# Patient Record
Sex: MALE | Race: WHITE | NOT HISPANIC OR LATINO | Employment: FULL TIME | URBAN - METROPOLITAN AREA
[De-identification: names, ages, dates, MRNs, and addresses within clinical notes are randomized per-mention and may not be internally consistent; named-entity substitution may affect disease eponyms.]

---

## 2022-07-29 ENCOUNTER — APPOINTMENT (EMERGENCY)
Dept: RADIOLOGY | Facility: HOSPITAL | Age: 22
End: 2022-07-29
Payer: COMMERCIAL

## 2022-07-29 ENCOUNTER — APPOINTMENT (EMERGENCY)
Dept: CT IMAGING | Facility: HOSPITAL | Age: 22
End: 2022-07-29
Payer: COMMERCIAL

## 2022-07-29 ENCOUNTER — HOSPITAL ENCOUNTER (EMERGENCY)
Facility: HOSPITAL | Age: 22
Discharge: HOME | End: 2022-07-29
Attending: EMERGENCY MEDICINE
Payer: COMMERCIAL

## 2022-07-29 ENCOUNTER — APPOINTMENT (OUTPATIENT)
Dept: URGENT CARE | Age: 22
End: 2022-07-29

## 2022-07-29 VITALS
RESPIRATION RATE: 18 BRPM | TEMPERATURE: 97.9 F | HEART RATE: 59 BPM | OXYGEN SATURATION: 99 % | DIASTOLIC BLOOD PRESSURE: 71 MMHG | SYSTOLIC BLOOD PRESSURE: 101 MMHG | WEIGHT: 182.98 LBS

## 2022-07-29 DIAGNOSIS — R07.9 CHEST PAIN: Primary | ICD-10-CM

## 2022-07-29 DIAGNOSIS — M79.672 LEFT FOOT PAIN: ICD-10-CM

## 2022-07-29 DIAGNOSIS — M25.511 RIGHT SHOULDER PAIN: ICD-10-CM
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
## 2022-07-29 DIAGNOSIS — M25.572 LEFT ANKLE PAIN: ICD-10-CM

## 2022-07-29 LAB
ALBUMIN SERPL BCP-MCNC: 4.2 G/DL (ref 3.5–5)
ALP SERPL-CCNC: 102 U/L (ref 46–116)
ALT SERPL W P-5'-P-CCNC: 36 U/L (ref 12–78)
ANION GAP SERPL CALCULATED.3IONS-SCNC: 6 MMOL/L (ref 4–13)
AST SERPL W P-5'-P-CCNC: 23 U/L (ref 5–45)
ATRIAL RATE: 62 BPM
BASOPHILS # BLD AUTO: 0.03 THOUSANDS/ΜL (ref 0–0.1)
BASOPHILS NFR BLD AUTO: 1 % (ref 0–1)
BILIRUB SERPL-MCNC: 0.48 MG/DL (ref 0.2–1)
BUN SERPL-MCNC: 13 MG/DL (ref 5–25)
CALCIUM SERPL-MCNC: 9.4 MG/DL (ref 8.3–10.1)
CHLORIDE SERPL-SCNC: 102 MMOL/L (ref 96–108)
CK MB SERPL-MCNC: 1.7 NG/ML (ref 0–5)
CK MB SERPL-MCNC: <1 % (ref 0–2.5)
CK SERPL-CCNC: 270 U/L (ref 39–308)
CO2 SERPL-SCNC: 30 MMOL/L (ref 21–32)
CREAT SERPL-MCNC: 0.87 MG/DL (ref 0.6–1.3)
EOSINOPHIL # BLD AUTO: 0.13 THOUSAND/ΜL (ref 0–0.61)
EOSINOPHIL NFR BLD AUTO: 2 % (ref 0–6)
ERYTHROCYTE [DISTWIDTH] IN BLOOD BY AUTOMATED COUNT: 13 % (ref 11.6–15.1)
GFR SERPL CREATININE-BSD FRML MDRD: 123 ML/MIN/1.73SQ M
GLUCOSE SERPL-MCNC: 107 MG/DL (ref 65–140)
HCT VFR BLD AUTO: 44.9 % (ref 36.5–49.3)
HGB BLD-MCNC: 15.3 G/DL (ref 12–17)
IMM GRANULOCYTES # BLD AUTO: 0.01 THOUSAND/UL (ref 0–0.2)
IMM GRANULOCYTES NFR BLD AUTO: 0 % (ref 0–2)
LIPASE SERPL-CCNC: 120 U/L (ref 73–393)
LYMPHOCYTES # BLD AUTO: 2.29 THOUSANDS/ΜL (ref 0.6–4.47)
LYMPHOCYTES NFR BLD AUTO: 35 % (ref 14–44)
MCH RBC QN AUTO: 28.7 PG (ref 26.8–34.3)
MCHC RBC AUTO-ENTMCNC: 34.1 G/DL (ref 31.4–37.4)
MCV RBC AUTO: 84 FL (ref 82–98)
MONOCYTES # BLD AUTO: 0.5 THOUSAND/ΜL (ref 0.17–1.22)
MONOCYTES NFR BLD AUTO: 8 % (ref 4–12)
NEUTROPHILS # BLD AUTO: 3.55 THOUSANDS/ΜL (ref 1.85–7.62)
NEUTS SEG NFR BLD AUTO: 54 % (ref 43–75)
NRBC BLD AUTO-RTO: 0 /100 WBCS
P AXIS: 27 DEGREES
PLATELET # BLD AUTO: 167 THOUSANDS/UL (ref 149–390)
PMV BLD AUTO: 11.3 FL (ref 8.9–12.7)
POTASSIUM SERPL-SCNC: 4 MMOL/L (ref 3.5–5.3)
PR INTERVAL: 156 MS
PROT SERPL-MCNC: 8.2 G/DL (ref 6.4–8.4)
QRS AXIS: 80 DEGREES
QRSD INTERVAL: 100 MS
QT INTERVAL: 390 MS
QTC INTERVAL: 395 MS
RBC # BLD AUTO: 5.34 MILLION/UL (ref 3.88–5.62)
SODIUM SERPL-SCNC: 138 MMOL/L (ref 135–147)
T WAVE AXIS: 45 DEGREES
VENTRICULAR RATE: 62 BPM
WBC # BLD AUTO: 6.51 THOUSAND/UL (ref 4.31–10.16)

## 2022-07-29 PROCEDURE — 96374 THER/PROPH/DIAG INJ IV PUSH: CPT

## 2022-07-29 PROCEDURE — 93005 ELECTROCARDIOGRAM TRACING: CPT

## 2022-07-29 PROCEDURE — G1004 CDSM NDSC: HCPCS

## 2022-07-29 PROCEDURE — 83690 ASSAY OF LIPASE: CPT | Performed by: EMERGENCY MEDICINE

## 2022-07-29 PROCEDURE — 74177 CT ABD & PELVIS W/CONTRAST: CPT

## 2022-07-29 PROCEDURE — 73030 X-RAY EXAM OF SHOULDER: CPT

## 2022-07-29 PROCEDURE — 93010 ELECTROCARDIOGRAM REPORT: CPT | Performed by: INTERNAL MEDICINE

## 2022-07-29 PROCEDURE — 85025 COMPLETE CBC W/AUTO DIFF WBC: CPT | Performed by: EMERGENCY MEDICINE

## 2022-07-29 PROCEDURE — 82553 CREATINE MB FRACTION: CPT | Performed by: EMERGENCY MEDICINE

## 2022-07-29 PROCEDURE — 99285 EMERGENCY DEPT VISIT HI MDM: CPT

## 2022-07-29 PROCEDURE — 71260 CT THORAX DX C+: CPT

## 2022-07-29 PROCEDURE — 82550 ASSAY OF CK (CPK): CPT | Performed by: EMERGENCY MEDICINE

## 2022-07-29 PROCEDURE — 99284 EMERGENCY DEPT VISIT MOD MDM: CPT | Performed by: EMERGENCY MEDICINE

## 2022-07-29 PROCEDURE — 36415 COLL VENOUS BLD VENIPUNCTURE: CPT | Performed by: EMERGENCY MEDICINE

## 2022-07-29 PROCEDURE — 80053 COMPREHEN METABOLIC PANEL: CPT | Performed by: EMERGENCY MEDICINE

## 2022-07-29 PROCEDURE — 73630 X-RAY EXAM OF FOOT: CPT

## 2022-07-29 PROCEDURE — 73610 X-RAY EXAM OF ANKLE: CPT

## 2022-07-29 RX ORDER — KETOROLAC TROMETHAMINE 30 MG/ML
15 INJECTION, SOLUTION INTRAMUSCULAR; INTRAVENOUS ONCE
Status: COMPLETED | OUTPATIENT
Start: 2022-07-29 | End: 2022-07-29

## 2022-07-29 RX ORDER — ACETAMINOPHEN 325 MG/1
975 TABLET ORAL ONCE
Status: COMPLETED | OUTPATIENT
Start: 2022-07-29 | End: 2022-07-29

## 2022-07-29 RX ADMIN — KETOROLAC TROMETHAMINE 15 MG: 30 INJECTION, SOLUTION INTRAMUSCULAR; INTRAVENOUS at 14:02

## 2022-07-29 RX ADMIN — ACETAMINOPHEN 975 MG: 325 TABLET, FILM COATED ORAL at 14:01

## 2022-07-29 RX ADMIN — IOHEXOL 53 ML: 350 INJECTION, SOLUTION INTRAVENOUS at 15:09

## 2022-07-29 NOTE — DISCHARGE INSTRUCTIONS
Rest, ice, elevation, compression of ankle    Can take ibuprofen 400 mg every 6 hours as needed for pain  Can take Tylenol 650 mg every 4 hours as needed for pain  Return to ER for worsening pain, shortness of breath, any other symptoms that concern you

## 2022-07-29 NOTE — Clinical Note
Estefania Cardona was seen and treated in our emergency department on 7/29/2022  Diagnosis:     Mohini Sky    He may return on this date: 08/01/2022    Can return if feeling improved  Can do limited activity if having significant pain  If you have any questions or concerns, please don't hesitate to call        Laura Allred MD    ______________________________           _______________          _______________  Hospital Representative                              Date                                Time

## 2022-07-29 NOTE — ED PROVIDER NOTES
History  Chief Complaint   Patient presents with    Chest Pain     Pt arrives after being pinned by a fork lift around 0630 this AM, pt states break failed and rolled onto this L foot and pt states "I had to pull myself away from the fork lift" pt complains of L sided rib/diaphragm pain  Pt eating in the waiting room      61-year-old male presents to the emergency department for chest wall and abdominal injury  Patient says that the emergency break on a forklift gave out and the forklift rolled and pinned him against a wall while he was at work today  It also ran over his left foot and he twisted his left ankle while this happened  He used his right arm to free himself  He now complains of pain to the anterior chest, left upper abdominal wall, right shoulder, left foot, left ankle  No anticoagulant or antiplatelet agent use  Denies headache, neck pain, back pain, other extremity pain, focal neurologic symptoms, or any other injuries or complaints  Chest Pain  Chest pain location: across anterior chest   Pain quality: tightness    Pain radiates to:  Does not radiate  Pain radiates to the back: no    Pain severity:  Moderate  Onset quality:  Sudden  Duration:  3 hours  Timing:  Constant  Progression:  Unchanged  Chronicity:  New  Relieved by:  Nothing  Worsened by:   Movement  Ineffective treatments:  None tried  Associated symptoms: abdominal pain    Associated symptoms: no back pain, no cough, no fever, no headache, no nausea, no shortness of breath and not vomiting    Foot Injury - Major  Location:  Ankle and foot  Time since incident:  3 hours  Injury: yes    Mechanism of injury: crush    Crush:     Mechanism:  Industrial machinery    Duration of crushing force:  3 hours  Ankle location:  L ankle  Foot location:  L foot  Pain details:     Quality:  Aching    Radiates to:  Does not radiate    Severity:  Moderate    Onset quality:  Sudden    Timing:  Constant    Progression:  Unchanged  Chronicity: New  Dislocation: no    Prior injury to area:  No  Worsened by:  Bearing weight  Ineffective treatments:  None tried  Associated symptoms: no back pain, no fever and no neck pain        None       Past Medical History:   Diagnosis Date    Inguinal hernia        No past surgical history on file  No family history on file  I have reviewed and agree with the history as documented  E-Cigarette/Vaping     E-Cigarette/Vaping Substances          Review of Systems   Constitutional: Negative  Negative for chills and fever  HENT: Negative  Negative for congestion and rhinorrhea  Eyes: Negative  Respiratory: Negative  Negative for cough and shortness of breath  Cardiovascular: Positive for chest pain  Negative for leg swelling  Gastrointestinal: Positive for abdominal pain  Negative for abdominal distention, diarrhea, nausea and vomiting  Musculoskeletal: Negative for back pain and neck pain  Left foot and ankle pain   Skin: Negative  Negative for rash  Neurological: Negative  Negative for light-headedness and headaches  Hematological: Negative  All other systems reviewed and are negative  Physical Exam  Physical Exam  Vitals and nursing note reviewed  Constitutional:       General: He is not in acute distress  Appearance: He is well-developed  HENT:      Head: Normocephalic and atraumatic  Mouth/Throat:      Mouth: Mucous membranes are moist    Eyes:      Pupils: Pupils are equal, round, and reactive to light  Cardiovascular:      Rate and Rhythm: Normal rate and regular rhythm  Heart sounds: Normal heart sounds  No murmur heard  No friction rub  No gallop  Pulmonary:      Effort: Pulmonary effort is normal  No respiratory distress  Breath sounds: Normal breath sounds  No stridor  No wheezing, rhonchi or rales  Chest:      Chest wall: Tenderness (across anterior chest) present  No deformity or crepitus     Abdominal:      Palpations: Abdomen is soft       Tenderness: There is abdominal tenderness (mild) in the left upper quadrant  There is no guarding or rebound  Musculoskeletal:         General: No swelling or tenderness  Normal range of motion  Cervical back: Normal range of motion and neck supple  Right lower leg: No edema  Left lower leg: No edema  Comments: Small ecchymosis over dorsum of left foot  Mild tenderness over navicular bone and lateral malleolus  Knee: Non-tender, no joint laxity with valgus/varus strain or anterior/posterior drawer test, no swelling  Ankle:  no joint laxity, no swelling   Foot: No tenderness of the 5th metatarsal  Distal sensation intact  Cap refill <2 seconds  Mild anterior right shoulder tenderness  M/U/R/A nerve sensation intact  Finger abduction/adduction/opposition intact  Suppination/Pronation intact without reproduction of pain  Able to 1+2 and 1+5 finger appose  Able to 2+3 finger cross  Good capillary refill  2+ radial pulses, bilaterally equal    BICEPS/TRICEPS 5/5  Shoulder abduction/adduction 5/5   2+ DP and PT pulses    Skin:     General: Skin is warm and dry  Capillary Refill: Capillary refill takes less than 2 seconds  Neurological:      Mental Status: He is alert and oriented to person, place, and time        Comments: Clear fluent speech         Vital Signs  ED Triage Vitals [07/29/22 1124]   Temperature Pulse Respirations Blood Pressure SpO2   98 2 °F (36 8 °C) 68 20 139/93 100 %      Temp Source Heart Rate Source Patient Position - Orthostatic VS BP Location FiO2 (%)   Oral Monitor Sitting Right arm --      Pain Score       --           Vitals:    07/29/22 1124 07/29/22 1306   BP: 139/93 101/71   Pulse: 68 59   Patient Position - Orthostatic VS: Sitting Sitting         Visual Acuity      ED Medications  Medications   ketorolac (TORADOL) injection 15 mg (15 mg Intravenous Given 7/29/22 1402)   acetaminophen (TYLENOL) tablet 975 mg (975 mg Oral Given 7/29/22 1401)   iohexol (OMNIPAQUE) 350 MG/ML injection (MULTI-DOSE) 53 mL (53 mL Intravenous Given 7/29/22 4609)       Diagnostic Studies  Results Reviewed     Procedure Component Value Units Date/Time    CKMB [[id number]]  (Normal) Collected: 07/29/22 1400    Lab Status: Final result Specimen: Blood from Arm, Right Updated: 07/29/22 1525     CK-MB Index <1 0 %      CK-MB 1 7 ng/mL     Lipase [[id number]]  (Normal) Collected: 07/29/22 1400    Lab Status: Final result Specimen: Blood from Arm, Right Updated: 07/29/22 1445     Lipase 120 u/L     CK (with reflex to MB) [[id number]]  (Normal) Collected: 07/29/22 1400    Lab Status: Final result Specimen: Blood from Arm, Right Updated: 07/29/22 1445     Total  U/L     Comprehensive metabolic panel [[id number]] Collected: 07/29/22 1400    Lab Status: Final result Specimen: Blood from Arm, Right Updated: 07/29/22 1426     Sodium 138 mmol/L      Potassium 4 0 mmol/L      Chloride 102 mmol/L      CO2 30 mmol/L      ANION GAP 6 mmol/L      BUN 13 mg/dL      Creatinine 0 87 mg/dL      Glucose 107 mg/dL      Calcium 9 4 mg/dL      AST 23 U/L      ALT 36 U/L      Alkaline Phosphatase 102 U/L      Total Protein 8 2 g/dL      Albumin 4 2 g/dL      Total Bilirubin 0 48 mg/dL      eGFR 123 ml/min/1 73sq m     Narrative:      [place] guidelines for Chronic Kidney Disease (CKD):     Stage 1 with normal or high GFR (GFR > 90 mL/min/1 73 square meters)    Stage 2 Mild CKD (GFR = 60-89 mL/min/1 73 square meters)    Stage 3A Moderate CKD (GFR = 45-59 mL/min/1 73 square meters)    Stage 3B Moderate CKD (GFR = 30-44 mL/min/1 73 square meters)    Stage 4 Severe CKD (GFR = 15-29 mL/min/1 73 square meters)    Stage 5 End Stage CKD (GFR <15 mL/min/1 73 square meters)  Note: GFR calculation is accurate only with a steady state creatinine    CBC and differential [[id number]] Collected: 07/29/22 1400    Lab Status: Final result Specimen: Blood from Arm, Right Updated: 07/29/22 1407     WBC 6 51 Thousand/uL      RBC 5 34 Million/uL      Hemoglobin 15 3 g/dL      Hematocrit 44 9 %      MCV 84 fL      MCH 28 7 pg      MCHC 34 1 g/dL      RDW 13 0 %      MPV 11 3 fL      Platelets 595 Thousands/uL      nRBC 0 /100 WBCs      Neutrophils Relative 54 %      Immat GRANS % 0 %      Lymphocytes Relative 35 %      Monocytes Relative 8 %      Eosinophils Relative 2 %      Basophils Relative 1 %      Neutrophils Absolute 3 55 Thousands/µL      Immature Grans Absolute 0 01 Thousand/uL      Lymphocytes Absolute 2 29 Thousands/µL      Monocytes Absolute 0 50 Thousand/µL      Eosinophils Absolute 0 13 Thousand/µL      Basophils Absolute 0 03 Thousands/µL                  CT chest abdomen pelvis w contrast   Final Result by Cecilia Webb MD (07/29 1550)      No acute CT findings  Workstation performed: [id number]         XR foot 3+ views LEFT   Final Result by Aisha Dexter MD (07/29 1427)      No acute osseous abnormality  Workstation performed: [id number]         XR ankle 3+ views LEFT   Final Result by Aisha Dexter MD (07/29 1427)      No acute osseous abnormality  Workstation performed: [id number]         XR shoulder 2+ views RIGHT   Final Result by Aisha Dexter MD (07/29 1428)      No acute osseous abnormality  Workstation performed: [id number]                    Procedures  Procedures         ED Course                                             MDM  Number of Diagnoses or Management Options  Chest pain  Forklift accident, initial encounter  Left ankle pain  Left foot pain  Right shoulder pain  Diagnosis management comments: Patient here with for COVID injury to chest, abdomen, right shoulder, left foot and ankle  Is hemodynamically stable  Mild tenderness on exam   Obtain CT and x-rays to evaluate for injuries  Trauma labs  Analgesics  Final assessment:  Imaging reassuring    Patient feels mildly improved on exam   Advised supportive care   Provided crutches and Ace wrap  Strict ED return precautions provided should symptoms worsen and patient can otherwise follow up outpatient  Patient expresses an understanding and agreement with the plan and remains in good condition for discharge  Disposition  Final diagnoses:   Chest pain   Forklift accident, initial encounter   Right shoulder pain   Left foot pain   Left ankle pain     Time reflects when diagnosis was documented in both MDM as applicable and the Disposition within this note     Time User Action Codes Description Comment    7/29/2022  3:57 PM Minor, Katlin Add [R07 9] Chest pain     7/29/2022  3:57 PM Minor, Katlin Add Kirstin Begin  9XXA] Forklift accident, initial encounter     7/29/2022  3:57 PM Minor, Katlin Add [M25 511] Right shoulder pain     7/29/2022  3:57 PM Minor, Tonnie Alejo Add [M79 672] Left foot pain     7/29/2022  3:57 PM Minor, Tonnie Alejo Add [M25 572] Left ankle pain       ED Disposition     ED Disposition   Discharge    Condition   Stable    Date/Time   Fri Jul 29, 2022  3:57 PM    Comment   Shaheed Ricks discharge to home/self care  Follow-up Information     Follow up With Specialties Details Why Contact Info Additional [address] [facility] Emergency Department Emergency Medicine Go to  If symptoms worsen [facility] [address]  [address] [facility] Emergency Department, [address] [facility] Medicine Call in 1 day  [address], [address] [facility] [address]  [address], [address], [address] [place], [place], [address]          There are no discharge medications for this patient  No discharge procedures on file      PDMP Review     None          ED Provider  Electronically Signed by           Tavo Tineo MD  07/29/22 8580

## 2022-08-01 ENCOUNTER — APPOINTMENT (OUTPATIENT)
Dept: URGENT CARE | Age: 22
End: 2022-08-01
Payer: OTHER MISCELLANEOUS

## 2022-08-01 PROCEDURE — 99213 OFFICE O/P EST LOW 20 MIN: CPT | Performed by: NURSE PRACTITIONER

## 2022-08-05 ENCOUNTER — APPOINTMENT (OUTPATIENT)
Dept: URGENT CARE | Age: 22
End: 2022-08-05
Payer: OTHER MISCELLANEOUS

## 2022-08-05 PROCEDURE — 99213 OFFICE O/P EST LOW 20 MIN: CPT

## 2022-08-12 ENCOUNTER — APPOINTMENT (OUTPATIENT)
Dept: URGENT CARE | Age: 22
End: 2022-08-12
Payer: OTHER MISCELLANEOUS

## 2022-08-12 PROCEDURE — 99213 OFFICE O/P EST LOW 20 MIN: CPT | Performed by: PREVENTIVE MEDICINE

## 2022-08-19 ENCOUNTER — APPOINTMENT (OUTPATIENT)
Dept: URGENT CARE | Age: 22
End: 2022-08-19
Payer: OTHER MISCELLANEOUS

## 2022-08-19 PROCEDURE — 99213 OFFICE O/P EST LOW 20 MIN: CPT | Performed by: PREVENTIVE MEDICINE

## 2024-01-16 ENCOUNTER — APPOINTMENT (EMERGENCY)
Dept: RADIOLOGY | Facility: HOSPITAL | Age: 24
End: 2024-01-16

## 2024-01-16 ENCOUNTER — HOSPITAL ENCOUNTER (EMERGENCY)
Facility: HOSPITAL | Age: 24
Discharge: HOME | End: 2024-01-16
Attending: EMERGENCY MEDICINE | Admitting: EMERGENCY MEDICINE

## 2024-01-16 VITALS
TEMPERATURE: 98.4 F | SYSTOLIC BLOOD PRESSURE: 120 MMHG | HEART RATE: 86 BPM | DIASTOLIC BLOOD PRESSURE: 70 MMHG | OXYGEN SATURATION: 100 % | RESPIRATION RATE: 18 BRPM

## 2024-01-16 DIAGNOSIS — S60.10XA SUBUNGUAL HEMATOMA OF FINGER, INITIAL ENCOUNTER: ICD-10-CM

## 2024-01-16 DIAGNOSIS — S60.00XA FINGER CONTUSION: Primary | ICD-10-CM

## 2024-01-16 PROCEDURE — 73130 X-RAY EXAM OF HAND: CPT

## 2024-01-16 PROCEDURE — 99284 EMERGENCY DEPT VISIT MOD MDM: CPT | Performed by: PHYSICIAN ASSISTANT

## 2024-01-16 PROCEDURE — 99283 EMERGENCY DEPT VISIT LOW MDM: CPT

## 2024-01-16 RX ORDER — ACETAMINOPHEN 500 MG
500 TABLET ORAL EVERY 6 HOURS PRN
Qty: 30 TABLET | Refills: 0 | Status: SHIPPED | OUTPATIENT
Start: 2024-01-16

## 2024-01-16 RX ORDER — ACETAMINOPHEN 325 MG/1
650 TABLET ORAL ONCE
Status: COMPLETED | OUTPATIENT
Start: 2024-01-16 | End: 2024-01-16

## 2024-01-16 RX ORDER — NAPROXEN 500 MG/1
500 TABLET ORAL 2 TIMES DAILY WITH MEALS
Qty: 30 TABLET | Refills: 0 | Status: SHIPPED | OUTPATIENT
Start: 2024-01-16

## 2024-01-16 RX ADMIN — ACETAMINOPHEN 650 MG: 325 TABLET, FILM COATED ORAL at 03:56

## 2024-01-16 NOTE — Clinical Note
Yunier Flores was seen and treated in our emergency department on 1/16/2024.    No restrictions            Diagnosis:     Yunier  may return to work on return date.    He may return on this date: 01/17/2024         If you have any questions or concerns, please don't hesitate to call.      Lisette Padgett PA-C    ______________________________           _______________          _______________  Hospital Representative                              Date                                Time

## 2024-01-16 NOTE — DISCHARGE INSTRUCTIONS
Use medications as needed.  Follow-up with orthopedic specialist if pain continues.  Return for new or worsening complaints.  Apply ice 3 times daily for 20 minutes each time.

## 2024-01-16 NOTE — ED PROVIDER NOTES
History  No chief complaint on file.    23-year-old right-handed male without significant past medical history presents complaining of right second and third digit pain after closing his hand in a car door.  Has not taken any medications prior to arrival.  Denies any other injury sustained.  Denies any other complaints.      History provided by:  Patient   used: No        None       Past Medical History:   Diagnosis Date    Inguinal hernia        No past surgical history on file.    No family history on file.  I have reviewed and agree with the history as documented.    E-Cigarette/Vaping     E-Cigarette/Vaping Substances          Review of Systems   Constitutional: Negative.  Negative for chills and fatigue.   HENT:  Negative for ear pain and sore throat.    Eyes:  Negative for photophobia and redness.   Respiratory:  Negative for apnea, cough and shortness of breath.    Cardiovascular:  Negative for chest pain.   Gastrointestinal:  Negative for abdominal pain, nausea and vomiting.   Genitourinary:  Negative for dysuria.   Musculoskeletal:  Negative for arthralgias, neck pain and neck stiffness.        R 2nd and 3rd digit pain    Skin:  Negative for rash.   Neurological:  Negative for dizziness, tremors, syncope and weakness.   Psychiatric/Behavioral:  Negative for suicidal ideas.        Physical Exam  Physical Exam  Constitutional:       General: He is not in acute distress.     Appearance: He is well-developed. He is not diaphoretic.   Eyes:      Pupils: Pupils are equal, round, and reactive to light.   Cardiovascular:      Rate and Rhythm: Normal rate and regular rhythm.   Pulmonary:      Effort: Pulmonary effort is normal. No respiratory distress.      Breath sounds: Normal breath sounds.   Abdominal:      General: Bowel sounds are normal. There is no distension.      Palpations: Abdomen is soft.   Musculoskeletal:         General: Normal range of motion.      Cervical back: Normal range of  "motion and neck supple.      Comments: Right hand without obvious deformity, minor swelling and erythema noted to the distal aspect of the right second digit with minor erythema noted to the distal phalanx of the right third digit.  Minor less than 25% subungual hematoma noted to the right second nail.  Cap refills in 2 seconds.  Range of motion intact.  Radial pulse 2+.  No obvious open breaks in skin or bleeding.   Skin:     General: Skin is warm and dry.   Neurological:      Mental Status: He is alert and oriented to person, place, and time.         Vital Signs  ED Triage Vitals [01/16/24 0343]   Temperature Pulse Respirations Blood Pressure SpO2   98.4 °F (36.9 °C) 86 18 120/70 100 %      Temp Source Heart Rate Source Patient Position - Orthostatic VS BP Location FiO2 (%)   Oral -- -- Left arm --      Pain Score       --           Vitals:    01/16/24 0343   BP: 120/70   Pulse: 86         Visual Acuity      ED Medications  Medications   acetaminophen (TYLENOL) tablet 650 mg (650 mg Oral Given 1/16/24 0356)       Diagnostic Studies  Results Reviewed       None                   XR hand 3+ views RIGHT   ED Interpretation by Lisette Padgett PA-C (01/16 0355)   No obvious osseous abnormality                  Procedures  Splint application    Date/Time: 1/16/2024 3:56 AM    Performed by: Lisette Padgett PA-C  Authorized by: Lisette Padgett PA-C  Universal Protocol:  Consent: Verbal consent obtained.  Risks and benefits: risks, benefits and alternatives were discussed  Consent given by: patient  Time out: Immediately prior to procedure a \"time out\" was called to verify the correct patient, procedure, equipment, support staff and site/side marked as required.  Patient understanding: patient states understanding of the procedure being performed  Patient consent: the patient's understanding of the procedure matches consent given  Procedure consent: procedure consent matches procedure scheduled  Relevant " documents: relevant documents present and verified  Test results: test results available and properly labeled  Site marked: the operative site was marked  Radiology Images displayed and confirmed. If images not available, report reviewed: imaging studies available  Required items: required blood products, implants, devices, and special equipment available  Patient identity confirmed: verbally with patient    Pre-procedure details:     Sensation:  Normal  Procedure details:     Laterality:  Right    Location:  Finger    Finger:  R index finger    Strapping: no      Splint type:  Finger splint, static    Supplies:  Aluminum splint           ED Course                                             Medical Decision Making  Patient presented with pain following a crush injury to the right second digit.  No obvious osseous abnormality was seen on imaging.  Patient was given a static removable splint for comfort.  Given orthopedic follow-up.  Educated on supportive care and return precautions.  Patient was neurovascular intact at time of exam.  Discharged home.    Amount and/or Complexity of Data Reviewed  Radiology: ordered and independent interpretation performed.    Risk  OTC drugs.             Disposition  Final diagnoses:   Finger contusion   Subungual hematoma of finger, initial encounter     Time reflects when diagnosis was documented in both MDM as applicable and the Disposition within this note       Time User Action Codes Description Comment    1/16/2024  3:57 AM Lisette Padgett Add [S60.00XA] Finger contusion     1/16/2024  3:57 AM Lisette Padgett Add [S60.10XA] Subungual hematoma of finger, initial encounter           ED Disposition       ED Disposition   Discharge    Condition   Stable    Date/Time   Tue Jan 16, 2024  3:57 AM    Comment   Yunier Flores discharge to home/self care.                   Follow-up Information       Follow up With Specialties Details Why Contact Info Additional Information      [facility] Emergency Department Emergency Medicine Go to  If symptoms worsen [address] [facility] [address] [phone] [facility] Emergency Department    [facility] Schedule an appointment as soon as possible for a visit in 1 day  [address]  [address]  [facility] [address] [phone] [facility] [place] [address] [address], [address], [place], [place], [address] [phone]            Patient's Medications   Discharge Prescriptions    ACETAMINOPHEN (TYLENOL) 500 MG TABLET    Take 1 tablet (500 mg total) by mouth every 6 (six) hours as needed for moderate pain       Start Date: 1/16/2024 End Date: --       Order Dose: 500 mg       Quantity: 30 tablet    Refills: 0    NAPROXEN (NAPROSYN) 500 MG TABLET    Take 1 tablet (500 mg total) by mouth 2 (two) times a day with meals       Start Date: 1/16/2024 End Date: --       Order Dose: 500 mg       Quantity: 30 tablet    Refills: 0       No discharge procedures on file.    PDMP Review       None            ED Provider  Electronically Signed by             Lisette Padgett PA-C  01/16/24 0359

## 2024-02-05 ENCOUNTER — APPOINTMENT (EMERGENCY)
Dept: RADIOLOGY | Facility: HOSPITAL | Age: 24
End: 2024-02-05
Payer: COMMERCIAL

## 2024-02-05 ENCOUNTER — HOSPITAL ENCOUNTER (EMERGENCY)
Facility: HOSPITAL | Age: 24
Discharge: HOME | End: 2024-02-05
Attending: EMERGENCY MEDICINE | Admitting: EMERGENCY MEDICINE
Payer: COMMERCIAL

## 2024-02-05 VITALS
HEART RATE: 75 BPM | TEMPERATURE: 98.1 F | OXYGEN SATURATION: 96 % | SYSTOLIC BLOOD PRESSURE: 150 MMHG | WEIGHT: 200 LBS | RESPIRATION RATE: 18 BRPM | DIASTOLIC BLOOD PRESSURE: 92 MMHG

## 2024-02-05 DIAGNOSIS — S69.90XA HAND INJURIES: Primary | ICD-10-CM

## 2024-02-05 DIAGNOSIS — T14.8XXA CRUSH INJURY: ICD-10-CM

## 2024-02-05 PROCEDURE — 99284 EMERGENCY DEPT VISIT MOD MDM: CPT | Performed by: EMERGENCY MEDICINE

## 2024-02-05 PROCEDURE — 96372 THER/PROPH/DIAG INJ SC/IM: CPT

## 2024-02-05 PROCEDURE — 73130 X-RAY EXAM OF HAND: CPT

## 2024-02-05 PROCEDURE — 99283 EMERGENCY DEPT VISIT LOW MDM: CPT

## 2024-02-05 RX ORDER — KETOROLAC TROMETHAMINE 30 MG/ML
30 INJECTION, SOLUTION INTRAMUSCULAR; INTRAVENOUS ONCE
Status: COMPLETED | OUTPATIENT
Start: 2024-02-05 | End: 2024-02-05

## 2024-02-05 RX ORDER — IBUPROFEN 600 MG/1
600 TABLET ORAL EVERY 6 HOURS PRN
Qty: 30 TABLET | Refills: 0 | Status: SHIPPED | OUTPATIENT
Start: 2024-02-05

## 2024-02-05 RX ADMIN — KETOROLAC TROMETHAMINE 30 MG: 30 INJECTION, SOLUTION INTRAMUSCULAR; INTRAVENOUS at 01:33

## 2024-02-05 NOTE — Clinical Note
Yunier Flores was seen and treated in our emergency department on 2/5/2024.                Diagnosis:     Yunier  .    He may return on this date:     Please excuse from work for the next three days.     If you have any questions or concerns, please don't hesitate to call.      Jose Mccall, DO    ______________________________           _______________          _______________  Hospital Representative                              Date                                Time

## 2024-02-05 NOTE — ED PROVIDER NOTES
History  Chief Complaint   Patient presents with    Hand Injury     Pt was moving a bed frame when the person that was holding the bed frame at the top lost their . The frame fell trapping the pt's hand between the wall and bed frame. Having tenderness on the top of L hand. Able to move fingers.     23-year-old gentleman presents with complaint of pain in his left hand.  He reports that he was moving a bed frame and the other individual lost their  causing the bed frame to pinch his hand against the wall.  The episode happened approximately 30 minutes prior to arrival and no medications were taken.  The patient applied ice and complains of pain diffusely across the dorsal surface of his hand.  He is able to move his fingers and has full sensation but movement does increase his pain.  Patient is right-hand dominant.      Hand Injury  Location:  Hand  Hand location:  L hand  Injury: yes    Time since incident:  30 minutes  Mechanism of injury: crush    Pain details:     Quality:  Aching    Radiates to:  Does not radiate    Severity:  Moderate    Onset quality:  Sudden    Duration: 30 minutes.    Timing:  Constant    Progression:  Unchanged  Handedness:  Right-handed  Dislocation: no    Prior injury to area:  No  Relieved by:  Nothing  Worsened by:  Movement  Ineffective treatments:  Ice  Associated symptoms: stiffness    Associated symptoms: no numbness, no swelling and no tingling        Prior to Admission Medications   Prescriptions Last Dose Informant Patient Reported? Taking?   acetaminophen (TYLENOL) 500 mg tablet   No No   Sig: Take 1 tablet (500 mg total) by mouth every 6 (six) hours as needed for moderate pain   naproxen (Naprosyn) 500 mg tablet   No No   Sig: Take 1 tablet (500 mg total) by mouth 2 (two) times a day with meals      Facility-Administered Medications: None       Past Medical History:   Diagnosis Date    Inguinal hernia        History reviewed. No pertinent surgical history.    History  reviewed. No pertinent family history.  I have reviewed and agree with the history as documented.    E-Cigarette/Vaping    E-Cigarette Use Never User      E-Cigarette/Vaping Substances     Social History     Tobacco Use    Smoking status: Never    Smokeless tobacco: Never   Vaping Use    Vaping status: Never Used   Substance Use Topics    Alcohol use: Never    Drug use: Never       Review of Systems   Musculoskeletal:  Positive for stiffness.   All other systems reviewed and are negative.      Physical Exam  Physical Exam  Vitals and nursing note reviewed.   Constitutional:       General: He is not in acute distress.     Appearance: Normal appearance. He is well-developed. He is not ill-appearing, toxic-appearing or diaphoretic.   HENT:      Head: Normocephalic and atraumatic.      Right Ear: External ear normal.      Left Ear: External ear normal.      Nose: Nose normal.   Eyes:      General: No scleral icterus.  Pulmonary:      Effort: Pulmonary effort is normal. No respiratory distress.   Musculoskeletal:      Cervical back: Normal range of motion and neck supple.   Skin:     General: Skin is warm and dry.      Capillary Refill: Capillary refill takes less than 2 seconds.      Findings: No abrasion, ecchymosis, erythema or laceration.          Neurological:      General: No focal deficit present.      Mental Status: He is alert and oriented to person, place, and time.      Sensory: No sensory deficit.      Motor: No weakness.   Psychiatric:         Mood and Affect: Mood normal.         Behavior: Behavior normal.         Vital Signs  ED Triage Vitals   Temperature Pulse Respirations Blood Pressure SpO2   02/05/24 0126 02/05/24 0126 02/05/24 0126 02/05/24 0126 02/05/24 0126   98.1 °F (36.7 °C) 75 18 150/92 96 %      Temp Source Heart Rate Source Patient Position - Orthostatic VS BP Location FiO2 (%)   02/05/24 0126 02/05/24 0126 02/05/24 0126 02/05/24 0126 --   Oral Other (Comment) Sitting Right arm       Pain  Score       02/05/24 0133       5           Vitals:    02/05/24 0126   BP: 150/92   Pulse: 75   Patient Position - Orthostatic VS: Sitting         Visual Acuity      ED Medications  Medications   ketorolac (TORADOL) injection 30 mg (30 mg Intramuscular Given 2/5/24 0133)       Diagnostic Studies  Results Reviewed       None                   XR hand 3+ views LEFT   ED Interpretation by Jose Mccall DO (02/05 0153)   No fx                 Procedures  Procedures         ED Course                               SBIRT 20yo+      Flowsheet Row Most Recent Value   Initial Alcohol Screen: US AUDIT-C     1. How often do you have a drink containing alcohol? 0 Filed at: 02/05/2024 0137   2. How many drinks containing alcohol do you have on a typical day you are drinking?  0 Filed at: 02/05/2024 0137   3a. Male UNDER 65: How often do you have five or more drinks on one occasion? 0 Filed at: 02/05/2024 0137   Audit-C Score 0 Filed at: 02/05/2024 0137   FAM: How many times in the past year have you...    Used an illegal drug or used a prescription medication for non-medical reasons? Never Filed at: 02/05/2024 0137                      Medical Decision Making  43-year-old gentleman presents with complaint of left hand pain.  He suffered a crush injury in which his hand was pinned between the wall and a bed frame.  X-ray showed no acute fracture or dislocation.  Suspect soft tissue injury/bruising only.  Patient will follow-up with orthopedics as needed.  Discussed supportive care measures and reasons to return to the ER.             Disposition  Final diagnoses:   Hand injuries   Crush injury     Time reflects when diagnosis was documented in both MDM as applicable and the Disposition within this note       Time User Action Codes Description Comment    2/5/2024  1:53 AM Jose Mccall [S69.90XA] Hand injuries     2/5/2024  1:53 AM Jose Mccall [T14.8XXA] Crush injury     2/5/2024  1:54 AM Jose Mccall Modify  [S69.90XA] Hand injuries     2/5/2024  1:54 AM Jose Mccall Modify [T14.8XXA] Crush injury     2/5/2024  1:54 AM Jose Mccall Modify [S69.90XA] Hand injuries     2/5/2024  1:54 AM Jose Mccall Modify [T14.8XXA] Crush injury           ED Disposition       ED Disposition   Discharge    Condition   Stable    Date/Time   Mon Feb 5, 2024 0153    Comment   Yunier Harperzeynep discharge to home/self care.                   Follow-up Information       Follow up With Specialties Details Why Contact Info Additional Information    [facility]  As needed [address]  [facility] [address] [phone] [facility] [place] [address], [place] [address], [place], [place], [address] [phone]            Patient's Medications   Discharge Prescriptions    IBUPROFEN (MOTRIN) 600 MG TABLET    Take 1 tablet (600 mg total) by mouth every 6 (six) hours as needed for mild pain       Start Date: 2/5/2024  End Date: --       Order Dose: 600 mg       Quantity: 30 tablet    Refills: 0       No discharge procedures on file.    PDMP Review       None            ED Provider  Electronically Signed by             Jose Mccall DO  02/05/24 0155

## 2024-08-12 ENCOUNTER — HOSPITAL ENCOUNTER (EMERGENCY)
Facility: HOSPITAL | Age: 24
Discharge: HOME | End: 2024-08-12
Attending: EMERGENCY MEDICINE
Payer: COMMERCIAL

## 2024-08-12 VITALS
RESPIRATION RATE: 18 BRPM | DIASTOLIC BLOOD PRESSURE: 81 MMHG | SYSTOLIC BLOOD PRESSURE: 133 MMHG | TEMPERATURE: 98.1 F | WEIGHT: 202.16 LBS | OXYGEN SATURATION: 98 % | HEART RATE: 98 BPM

## 2024-08-12 DIAGNOSIS — L60.0 INGROWN NAIL OF GREAT TOE OF LEFT FOOT: Primary | ICD-10-CM

## 2024-08-12 PROCEDURE — 99283 EMERGENCY DEPT VISIT LOW MDM: CPT

## 2024-08-12 PROCEDURE — 99284 EMERGENCY DEPT VISIT MOD MDM: CPT | Performed by: EMERGENCY MEDICINE

## 2024-08-12 RX ORDER — LIDOCAINE HYDROCHLORIDE 10 MG/ML
5 INJECTION, SOLUTION EPIDURAL; INFILTRATION; INTRACAUDAL; PERINEURAL ONCE
Status: COMPLETED | OUTPATIENT
Start: 2024-08-12 | End: 2024-08-12

## 2024-08-12 RX ORDER — ACETAMINOPHEN 325 MG/1
650 TABLET ORAL ONCE
Status: COMPLETED | OUTPATIENT
Start: 2024-08-12 | End: 2024-08-12

## 2024-08-12 RX ORDER — IBUPROFEN 600 MG/1
600 TABLET, FILM COATED ORAL ONCE
Status: COMPLETED | OUTPATIENT
Start: 2024-08-12 | End: 2024-08-12

## 2024-08-12 RX ADMIN — LIDOCAINE HYDROCHLORIDE 5 ML: 10 INJECTION, SOLUTION EPIDURAL; INFILTRATION; INTRACAUDAL; PERINEURAL at 21:17

## 2024-08-12 RX ADMIN — ACETAMINOPHEN 650 MG: 325 TABLET ORAL at 21:17

## 2024-08-12 RX ADMIN — IBUPROFEN 600 MG: 600 TABLET, FILM COATED ORAL at 21:17

## 2024-08-12 NOTE — Clinical Note
Yunier Flores was seen and treated in our emergency department on 8/12/2024.                Diagnosis:     Yunier  may return to work on return date.    He may return on this date: 08/14/2024         If you have any questions or concerns, please don't hesitate to call.      Seda Yarbrough MD    ______________________________           _______________          _______________  Hospital Representative                              Date                                Time

## 2024-08-12 NOTE — Clinical Note
Yunier Flores was seen and treated in our emergency department on 8/12/2024.                Diagnosis:     Yunier  may return to work on return date.    He may return on this date: 08/13/2024         If you have any questions or concerns, please don't hesitate to call.      Seda Yarbrough MD    ______________________________           _______________          _______________  Hospital Representative                              Date                                Time

## 2024-08-13 NOTE — ED PROVIDER NOTES
History  Chief Complaint   Patient presents with    Toe Pain     Left great toe with swelling and increasing pain d/t recent infection; was treated with Doxy by urgent care; has 3 caps left but pain got worse so came to ED. No known fevers at home. Tylenol 4-5hrs ago.      23-year-old male presenting emerged department with left great toe swelling and pain.  Patient was on doxycycline for possible paronychia.  Taking antibiotics without relief.        Prior to Admission Medications   Prescriptions Last Dose Informant Patient Reported? Taking?   acetaminophen (TYLENOL) 500 mg tablet Not Taking  No No   Sig: Take 1 tablet (500 mg total) by mouth every 6 (six) hours as needed for moderate pain   Patient not taking: Reported on 8/12/2024   ibuprofen (MOTRIN) 600 mg tablet Not Taking  No No   Sig: Take 1 tablet (600 mg total) by mouth every 6 (six) hours as needed for mild pain   Patient not taking: Reported on 8/12/2024   naproxen (Naprosyn) 500 mg tablet Not Taking  No No   Sig: Take 1 tablet (500 mg total) by mouth 2 (two) times a day with meals   Patient not taking: Reported on 8/12/2024      Facility-Administered Medications: None       Past Medical History:   Diagnosis Date    Inguinal hernia        History reviewed. No pertinent surgical history.    History reviewed. No pertinent family history.  I have reviewed and agree with the history as documented.    E-Cigarette/Vaping    E-Cigarette Use Never User      E-Cigarette/Vaping Substances     Social History     Tobacco Use    Smoking status: Never    Smokeless tobacco: Never   Vaping Use    Vaping status: Never Used   Substance Use Topics    Alcohol use: Never    Drug use: Never       Review of Systems   Constitutional:  Negative for chills and fever.   HENT:  Negative for ear pain and sore throat.    Eyes:  Negative for pain and visual disturbance.   Respiratory:  Negative for cough and shortness of breath.    Cardiovascular:  Negative for chest pain and  palpitations.   Gastrointestinal:  Negative for abdominal pain and vomiting.   Genitourinary:  Negative for dysuria and hematuria.   Musculoskeletal:  Positive for arthralgias. Negative for back pain.   Skin:  Negative for color change and rash.   Neurological:  Negative for seizures and syncope.   All other systems reviewed and are negative.      Physical Exam  Physical Exam  Vitals and nursing note reviewed.   Constitutional:       General: He is not in acute distress.     Appearance: He is well-developed.   HENT:      Head: Normocephalic and atraumatic.   Eyes:      Conjunctiva/sclera: Conjunctivae normal.   Cardiovascular:      Rate and Rhythm: Normal rate and regular rhythm.      Heart sounds: No murmur heard.  Pulmonary:      Effort: Pulmonary effort is normal. No respiratory distress.      Breath sounds: Normal breath sounds.   Abdominal:      Palpations: Abdomen is soft.      Tenderness: There is no abdominal tenderness.   Musculoskeletal:         General: Swelling present.      Cervical back: Neck supple.      Comments: Left big toe with overgrown skin with ingrown toenail   Skin:     General: Skin is warm and dry.      Capillary Refill: Capillary refill takes less than 2 seconds.   Neurological:      Mental Status: He is alert.   Psychiatric:         Mood and Affect: Mood normal.         Vital Signs  ED Triage Vitals   Temperature Pulse Respirations Blood Pressure SpO2   08/12/24 2102 08/12/24 2102 08/12/24 2102 08/12/24 2102 08/12/24 2102   98.1 °F (36.7 °C) 98 18 133/81 98 %      Temp Source Heart Rate Source Patient Position - Orthostatic VS BP Location FiO2 (%)   08/12/24 2102 08/12/24 2102 08/12/24 2102 08/12/24 2102 --   Oral Monitor Lying Left arm       Pain Score       08/12/24 2117       4           Vitals:    08/12/24 2102   BP: 133/81   Pulse: 98   Patient Position - Orthostatic VS: Lying         Visual Acuity      ED Medications  Medications   acetaminophen (TYLENOL) tablet 650 mg (650 mg Oral  Given 8/12/24 2117)   ibuprofen (MOTRIN) tablet 600 mg (600 mg Oral Given 8/12/24 2117)   lidocaine (PF) (XYLOCAINE-MPF) 1 % injection 5 mL (5 mL Infiltration Given by Other 8/12/24 2117)       Diagnostic Studies  Results Reviewed       None                   No orders to display              Procedures  Nail removal    Date/Time: 8/12/2024 9:13 PM    Performed by: Seda Yarbrough MD  Authorized by: Seda Yarbrough MD    Patient location:  ED  Indications / Diagnosis:  Ingrown toenail  Universal Protocol:  Risks and benefits: risks, benefits and alternatives were discussed  Consent given by: patient  Patient understanding: patient states understanding of the procedure being performed  Patient consent: the patient's understanding of the procedure matches consent given  Patient identity confirmed: verbally with patient    Location:     Foot:  L big toe  Pre-procedure details:     Skin preparation:  Alcohol  Anesthesia (see MAR for exact dosages):     Anesthesia method:  Local infiltration    Local anesthetic:  Lidocaine 1% w/o epi  Nail Removal:     Nail removed:  Vertical    Nail bed sutured: no      Removed nail replaced and anchored: no    Ingrown nail:     Wedge excision of skin: yes      Nail matrix removed or ablated:  None  Nails trimmed:     Number of nails trimmed:  1  Post-procedure details:     Dressing:  4x4 sterile gauze    Patient tolerance of procedure:  Tolerated well, no immediate complications           ED Course                                 SBIRT 22yo+      Flowsheet Row Most Recent Value   Initial Alcohol Screen: US AUDIT-C     1. How often do you have a drink containing alcohol? 1 Filed at: 08/12/2024 2102   2. How many drinks containing alcohol do you have on a typical day you are drinking?  1 Filed at: 08/12/2024 2102   3a. Male UNDER 65: How often do you have five or more drinks on one occasion? 0 Filed at: 08/12/2024 2102   Audit-C Score 2 Filed at: 08/12/2024 2102   FAM:  How many times in the past year have you...    Used an illegal drug or used a prescription medication for non-medical reasons? Never Filed at: 08/12/2024 2102                      Medical Decision Making  23-year-old male with ingrown toenail, was seen prior to urgent care but ingrown toenail was not removed.  We did remove it today, recommend warm soaks.  PCP follow-up.    Risk  OTC drugs.  Prescription drug management.                 Disposition  Final diagnoses:   Ingrown nail of great toe of left foot     Time reflects when diagnosis was documented in both MDM as applicable and the Disposition within this note       Time User Action Codes Description Comment    8/12/2024  9:13 PM Seda Yarbrough Add [L60.0] Ingrown nail of great toe of left foot           ED Disposition       ED Disposition   Discharge    Condition   Stable    Date/Time   Mon Aug 12, 2024 2134    Comment   Yunier Flores discharge to home/self care.                   Follow-up Information    None         Patient's Medications   Discharge Prescriptions    No medications on file       No discharge procedures on file.    PDMP Review       None            ED Provider  Electronically Signed by             Seda Yarbrough MD  08/12/24 2114       Seda Yarbrough MD  08/12/24 2135